# Patient Record
Sex: FEMALE | Race: OTHER | URBAN - METROPOLITAN AREA
[De-identification: names, ages, dates, MRNs, and addresses within clinical notes are randomized per-mention and may not be internally consistent; named-entity substitution may affect disease eponyms.]

---

## 2024-10-30 ENCOUNTER — OFFICE VISIT (OUTPATIENT)
Age: 69
End: 2024-10-30

## 2024-10-30 VITALS
OXYGEN SATURATION: 99 % | DIASTOLIC BLOOD PRESSURE: 72 MMHG | WEIGHT: 144.2 LBS | TEMPERATURE: 97.8 F | HEART RATE: 67 BPM | SYSTOLIC BLOOD PRESSURE: 116 MMHG | BODY MASS INDEX: 23.18 KG/M2 | HEIGHT: 66 IN

## 2024-10-30 DIAGNOSIS — R35.0 FREQUENCY OF URINATION: Primary | ICD-10-CM

## 2024-10-30 LAB
BILIRUBIN, POC: ABNORMAL
BLOOD URINE, POC: ABNORMAL
CLARITY, POC: ABNORMAL
COLOR, POC: YELLOW
GLUCOSE URINE, POC: ABNORMAL MG/DL
KETONES, POC: ABNORMAL MG/DL
LEUKOCYTE EST, POC: ABNORMAL
NITRITE, POC: ABNORMAL
PH, POC: 7
PROTEIN, POC: ABNORMAL MG/DL
SPECIFIC GRAVITY, POC: 1.02
UROBILINOGEN, POC: 0.2 MG/DL

## 2024-10-30 RX ORDER — SULFAMETHOXAZOLE AND TRIMETHOPRIM 800; 160 MG/1; MG/1
1 TABLET ORAL 2 TIMES DAILY
Qty: 14 TABLET | Refills: 0 | Status: SHIPPED | OUTPATIENT
Start: 2024-10-30 | End: 2024-11-06

## 2024-10-30 NOTE — PROGRESS NOTES
Sylvia Luciano (:  1955) is a 69 y.o. female,New patient, here for evaluation of the following chief complaint(s):  Urinary Tract Infection (Frequent urination for two days)      ASSESSMENT/PLAN:  1. Frequency of urination  - POCT Urinalysis no Micro  - Culture, Urine  - sulfamethoxazole-trimethoprim (BACTRIM DS;SEPTRA DS) 800-160 MG per tablet; Take 1 tablet by mouth 2 times daily for 7 days  Dispense: 14 tablet; Refill: 0   -TIME SPENT WITH PATIENT 27 MINUTES.    Return if symptoms worsen or fail to improve.   Result Notes      Component  Ref Range & Units 10/30/24 1400   Color, UA yellow   Clarity, UA cloudy   Glucose, UA POC  mg/dL neg   Bilirubin, UA neg   Ketones, UA  mg/dL neg   Spec Grav, UA 1.020   Blood, UA POC trace   pH, UA 7.0   Protein, UA POC  mg/dL trace   Urobilinogen, UA  mg/dL 0.2   Leukocytes, UA neg   Nitrite, UA neg                SUBJECTIVE/OBJECTIVE:  PRESENT TO CLINIC WITH FREQUENCY OF URINATION FOR TWO DAYS. WORRY ABOUT URINARY INFECTION. NO FEVER, NO NAUSEA OR VOMITING.      History provided by:  Patient  Urinary Tract Infection        Vitals:    10/30/24 1346   BP: 116/72   Site: Left Upper Arm   Position: Sitting   Cuff Size: Medium Adult   Pulse: 67   Temp: 97.8 °F (36.6 °C)   TempSrc: Oral   SpO2: 99%   Weight: 65.4 kg (144 lb 3.2 oz)   Height: 1.676 m (5' 6\")       Review of Systems   Genitourinary:  Positive for frequency.       Physical Exam  Constitutional:       Appearance: Normal appearance.   Pulmonary:      Effort: Pulmonary effort is normal.      Breath sounds: Normal breath sounds.   Abdominal:      Tenderness: There is no abdominal tenderness. There is no right CVA tenderness or left CVA tenderness.   Musculoskeletal:         General: Normal range of motion.      Cervical back: Normal range of motion and neck supple.   Skin:     General: Skin is warm.   Neurological:      Mental Status: She is alert and oriented to person, place, and time.   Psychiatric:         Mood

## 2024-11-01 LAB — BACTERIA UR CULT: NORMAL
